# Patient Record
Sex: MALE | Race: BLACK OR AFRICAN AMERICAN
[De-identification: names, ages, dates, MRNs, and addresses within clinical notes are randomized per-mention and may not be internally consistent; named-entity substitution may affect disease eponyms.]

---

## 2017-01-09 NOTE — PHYS DOC
Past Medical History


Past Medical History:  No Pertinent History


Past Surgical History:  No Surgical History


Additional Information:  


1/2 ppd


Alcohol Use:  Rarely


Drug Use:  Marijuana





Adult General


Chief Complaint


Chief Complaint:  HEADACHE





HPI


HPI


Patient is a 21  year old male presents to the emergency department stating 

that he had smoked some marijuana tonight when he developed shortness of air 

numbness and tingling down into his left arm as well as into his lower 

extremities. Patient states that he also has a headache on the left side of his 

head. Patient denies any visual difficulty. He denies any chest pain or 

discomfort. Patient states that he does not smoke marijuana daily. Patient 

states he's never had this reaction in the past. Patient states at the current 

time he just has a headache on the left side of his head. All other signs and 

symptoms have resolved.





Review of Systems


Review of Systems





Constitutional: Denies fever or chills []


Eyes: Denies change in visual acuity, redness, or eye pain []


HENT: Denies nasal congestion or sore throat []


Respiratory: Denies cough or shortness of breath []


Cardiovascular: No additional information not addressed in HPI [


Musculoskeletal: Denies back pain or joint pain []


Integument: Denies rash or skin lesions []


Neurologic:  headache, denies focal weakness or sensory changes []





Allergies


Allergies





 Allergies








Coded Allergies Type Severity Reaction Last Updated Verified


 


  No Known Drug Allergies    3/3/16 No











Physical Exam


Physical Exam





Constitutional: Well developed, well nourished, no acute distress, non-toxic 

appearance. []


HENT: Normocephalic, atraumatic, bilateral external ears normal, oropharynx 

moist, no oral exudates, nose normal. Bilateral tympanic membranes appear to be 

normal throat appears to have erythematous with postnasal drip. Negative for 

frontal and maxillary sinus tenderness.


Eyes: PERRLA, EOMI, conjunctiva normal, no discharge. [] 


Neck: Normal range of motion, no tenderness, supple, no stridor. [] 


Cardiovascular:Heart rate regular rhythm, no murmur []


Lungs & Thorax:  Bilateral breath sounds clear to auscultation []


Skin: Warm, dry, no erythema, no rash. [] 


Back: No tenderness


Extremities: No tenderness, no cyanosis, no clubbing, ROM intact, no edema. 

Peripheral pulses 2+ cap refill brisk less than 2 seconds patient was noted to 

have equal strength bilaterally.


Neurologic: Alert and oriented X 3, normal motor function, normal sensory 

function, no focal deficits noted. []


Psychologic: Affect normal, judgement normal, mood normal. []





Current Patient Data


Vital Signs





 Vital Signs








  Date Time  Temp Pulse Resp B/P Pulse Ox O2 Delivery O2 Flow Rate FiO2


 


1/9/17 21:33 97.9 80 16  98 Room Air  





 97.9       








Lab Values





 Laboratory Tests








Test


  1/9/17


21:50


 


Urine Opiates Screen Neg (NEG)  


 


Urine Methadone Screen Neg (NEG)  


 


Urine Barbiturates Neg (NEG)  


 


Urine Phencyclidine Screen Neg (NEG)  


 


Urine


Amphetamine/Methamphetamine Neg (NEG)  


 


 


Urine Benzodiazepines Screen Neg (NEG)  


 


Urine Cocaine Screen Neg (NEG)  


 


Urine Cannabinoids Screen Pos (NEG)  


 


Urine Ethyl Alcohol Neg (NEG)  











EKG


EKG


EKG was completed at 20/1/50 was read by Dr. Liang as no STEMI. Patient does have 

some abnormality noted in the EKG. Heart rate was 81.





Radiology/Procedures


Radiology/Procedures


[]





Course & Med Decision Making


Course & Med Decision Making


Pertinent Labs and Imaging studies reviewed. (See chart for details)


Patient's urine was positive for cancer [employer]. EKG was completed patient will 

be recommended to follow-up with cardiologist in the next 4 days. Signs and 

symptoms to return back to emergency department as been provided. Patient be 

provided with Vistaril as well as ibuprofen here in the emergency department. He

'll be discharged home in stable condition with recommendations to avoid 

recreational drugs in the future. Patient agrees with discharge instructions 

treatment regimens and follow-up recommendations.


[]





Dragon Disclaimer


Dragon Disclaimer


This electronic medical record was generated, in whole or in part, using a 

voice recognition dictation system.





Departure


Departure


Impression:  


 Primary Impression:  


 Anxiety


 Additional Impression:  


 Headache


Disposition:  01 HOME, SELF-CARE


Condition:  STABLE


Referrals:  


NO PCP (PCP)








SAMRA MILLER MD


Patient Instructions:  Anxiety and Panic Attacks, Easy-to-Read, General 

Headache Without Cause





Additional Instructions:


Activity as tolerated.


Tylenol or ibuprofen for pain and discomfort.


Avoid any type or recreational drugs including marijuana, or methamphetamines.


Follow-up with radiologist in the next 4 days.


Return back to emergency percent symptoms become worse.





Problem Qualifiers








MAIK EM NP Jan 9, 2017 22:24

## 2017-01-10 NOTE — EKG
[facility]

              [address] [place] [address]

Test Date:    2017               Test Time:    21:50:13

Pat Name:     MICHAEL MONAE        Department:   

Patient ID:   [id number]           Room:          

Gender:       M                        Technician:   

:          1995               Requested By: MAIK EM

Order Number: [id number]            Reading MD:   Rodrigo Oconnor

                                 Measurements

Intervals                              Axis          

Rate:         81                       P:            43

ME:           164                      QRS:          66

QRSD:         88                       T:            39

QT:           362                                    

QTc:          421                                    

                           Interpretive Statements

SINUS RHYTHM

INCOMPLETE RIGHT BUNDLE BRANCH BLOCK



Electronically Signed On 1- 15:47:53 CST by Rodrigo Oconnor

## 2017-01-11 NOTE — RAD
EXAM: Chest, 2 views.



HISTORY: Chest pain.



COMPARISON: None.



FINDINGS: Frontal and lateral views of the chest are obtained. There is no

infiltrate, effusion or pneumothorax. The heart is normal in size. There is

hyperinflation likely due to inspiratory effort.



IMPRESSION: No acute pulmonary finding.

## 2017-01-11 NOTE — PHYS DOC
Past Medical History


Past Medical History:  Asthma


Past Surgical History:  No Surgical History


Alcohol Use:  None


Drug Use:  Marijuana





Adult General


Chief Complaint


Chief Complaint:  ABDOMINAL PAIN





HPI


HPI


Patient is a 21  year old male who presents with complaint of abdominal pain. 

Patient states that his symptoms have been present for the past 2 days. Patient 

states that he has been having problems with stress and anxiety over the past 

week. Patient started getting "squeezing" pain in the upper portion of his 

abdomen that he rates currently as 6 out of 10. Patient states that he has had 

decreased appetite. Patient denies any nausea or vomiting. Patient states he 

took ibuprofen earlier today with no relief in symptoms. The patient denies any 

history of stomach problems. Patient has had subjective fever but no chills and 

denies diarrhea. The patient states that he has had mild radiation of pain up 

towards left side of his chest.





Review of Systems


Review of Systems





Constitutional: Denies fever or chills []


Eyes: Denies change in visual acuity, redness, or eye pain []


HENT: Denies nasal congestion or sore throat []


Respiratory: Denies cough or shortness of breath []


Cardiovascular: Chest pain []


GI: Abdominal pain, anorexia, denies nausea, vomiting, bloody stools or 

diarrhea []


: Denies dysuria or hematuria []


Musculoskeletal: Denies back pain or joint pain []


Integument: Denies rash or skin lesions []


Neurologic: Denies headache, focal weakness or sensory changes []


Endocrine: Denies polyuria or polydipsia []





Current Medications


Current Medications





 Current Medications








 Medications


  (Trade)  Dose


 Ordered  Sig/Lorenzo  Start Time


 Stop Time Status Last Admin


Dose Admin


 


 Acetaminophen


  (Tylenol)  650 mg  1X  ONCE  17 11:45


 17 11:46 DC 17 12:12


650 MG


 


 Al Hydroxide/Mg


 Hydroxide


  (Mylanta Plus Xs)  30 ml  1X  ONCE  17 11:45


 17 11:46 DC 17 12:11


30 ML


 


 Famotidine


  (Pepcid)  20 mg  1X  ONCE  17 11:45


 17 11:46 DC 17 12:12


20 MG


 


 Ondansetron HCl


  (Zofran Odt)  4 mg  1X  ONCE  17 11:45


 17 11:46 DC 17 12:12


4 MG











Allergies


Allergies





 Allergies








Coded Allergies Type Severity Reaction Last Updated Verified


 


  No Known Drug Allergies    3/3/16 No











Physical Exam


Physical Exam





Constitutional: Alert, afebrile, appears mildly ill. []


HENT: Normocephalic, atraumatic, bilateral external ears normal, oropharynx 

moist, no oral exudates, nose normal. []


Eyes: PERRLA, EOMI, conjunctiva normal, no discharge. [] 


Neck: Normal range of motion, no tenderness, supple, no stridor. [] 


Cardiovascular:Heart rate regular rhythm, no murmur []


Lungs & Thorax:  Bilateral breath sounds clear to auscultation []


Abdomen: Bowel sounds normal, soft, no tenderness, no masses, no pulsatile 

masses. [] 


Skin: Warm, dry, no erythema, no rash. [] 


Back: No tenderness, no CVA tenderness. [] 


Extremities: No tenderness, no cyanosis, no clubbing, ROM intact, no edema. [] 


Neurologic: Alert and oriented X 3, normal motor function, normal sensory 

function, no focal deficits noted. []





Current Patient Data


Vital Signs





 Vital Signs








  Date Time  Temp Pulse Resp B/P Pulse Ox O2 Delivery O2 Flow Rate FiO2


 


17 10:20 97.8 60 18 116/68 98 Room Air  





 97.8       








Lab Values





 Laboratory Tests








Test


  17


11:56


 


Group A Streptococcus Rapid


  Negative


(NEGATIVE)











EKG


EKG


Interpreted by me: Heart rate 54, sinus rhythm, normal intervals, incomplete 

right bundle branch block, J-point elevation, no acute ST elevation or 

depression []





Radiology/Procedures


Radiology/Procedures





 [facility]


 [address] [place], [place] [id number] [phone]


 


 IMAGING REPORT





 Signed





PATIENT: MICHAEL MONAE ACCOUNT: [id number] MRN#: [id number]


: 1995 LOCATION: ER AGE: 21


SEX: M EXAM DT: 17 ACCESSION#: [id number]


STATUS: REG ER ORD. PHYSICIAN: SENIA COOPER MD 


REASON: L sided chest pain


PROCEDURE: CHEST PA & LATERAL














EXAM: Chest, 2 views.





HISTORY: Chest pain.





COMPARISON: None.





FINDINGS: Frontal and lateral views of the chest are obtained. There is no


infiltrate, effusion or pneumothorax. The heart is normal in size. There is


hyperinflation likely due to inspiratory effort.





IMPRESSION: No acute pulmonary finding.

















DICTATED and SIGNED BY:     KWASI PAULINO MD


DATE:     17 1157





CC: SENIA COOPER MD; NO PCP ~


[]





Course & Med Decision Making


Course & Med Decision Making


Pertinent Labs and Imaging studies reviewed. (See chart for details)





Patient was given Maalox, Pepcid, Zofran, Tylenol to help with symptoms. The 

patient's symptoms appear consistent with gastritis. Advised the patient follow-

up in 2-3 days with his primary doctor for reevaluation and return to emergency 

department for any worsening symptoms. Patient voiced understanding and in 

agreement with treatment plan.





Dragon Disclaimer


Dragon Disclaimer


This electronic medical record was generated, in whole or in part, using a 

voice recognition dictation system.





Departure


Departure


Impression:  


 Primary Impression:  


 Gastritis


 Additional Impression:  


 Anxiety


Disposition:   HOME, SELF-CARE


Condition:  IMPROVED


Referrals:  


NO PCP (PCP)


Patient Instructions:  Gastritis, Adult





Additional Instructions:


Follow-up with your primary doctor in 2-3 days. Return to the emergency 

department for any worsening symptoms.


Scripts


Famotidine (Pepcid)20 Mg [id number] Mg PO BID #30 TAB


   Prov:SENIA COOPER MD         17





Problem Qualifiers








 Primary Impression:  


 Gastritis


 Gastritis type:  unspecified gastritis  Chronicity:  acute  Gastritis bleeding

:  without bleeding  Qualified Code:  K29.00 - Acute gastritis without bleeding





SENIA COOPER MD 2017 12:39

## 2017-01-11 NOTE — EKG
[facility]

              [address] [place] [address]

Test Date:    2017               Test Time:    11:39:03

Pat Name:     MICHAEL MONAE        Department:   

Patient ID:   [id number]           Room:          

Gender:       M                        Technician:   

:          1995               Requested By: SENIA COOPER

Order Number: [id number]            Reading MD:   Rodrigo Oconnor

                                 Measurements

Intervals                              Axis          

Rate:         54                       P:            41

NM:           156                      QRS:          46

QRSD:         84                       T:            31

QT:           390                                    

QTc:          371                                    

                           Interpretive Statements

SINUS RHYTHM



Electronically Signed On 2017 11:26:58 CST by Rodrigo Oconnor

## 2017-01-28 NOTE — PHYS DOC
Past Medical History


Past Medical History:  Anxiety, Asthma


Past Surgical History:  No Surgical History


Alcohol Use:  None


Drug Use:  Marijuana





Adult General


Chief Complaint


Chief Complaint:  MULTIPLE COMPLAINTS





HPI


HPI


Patient is a 21  year old male who presents with feeling anxious since trying 

to go to bed.  His symptoms start with palpitations, headache, and then gets 

body tingling. He was trying to breath through his symptoms, but his girlfriend 

told him to come to the ED. He is feeling mostly better since being here. He 

states this has happened a few times recently and he is concerned about it 

becoming more frequent. He denies vision changes, dizziness, chest pain, dyspnea

, numbness, weakness, abd pain, f/c, n/v.





Review of Systems


Review of Systems





Constitutional: Denies fever or chills []


Eyes: Denies change in visual acuity, redness, or eye pain []


HENT: Denies nasal congestion or sore throat []


Respiratory: Denies cough or shortness of breath []


Cardiovascular: No additional information not addressed in HPI []


GI: Denies abdominal pain, nausea, vomiting, bloody stools or diarrhea []


: Denies dysuria or hematuria []


Musculoskeletal: Denies back pain or joint pain []


Integument: Denies rash or skin lesions []


Neurologic: Denies focal weakness or sensory changes []


Endocrine: Denies polyuria or polydipsia []





Current Medications


Current Medications





 Current Medications








 Medications


  (Trade)  Dose


 Ordered  Sig/[facility]  Start Time


 Stop Time Status Last Admin


Dose Admin


 


 Hydroxyzine HCl


  (Atarax)  10 mg  1X  ONCE  1/28/17 00:45


 1/28/17 00:46 DC 1/28/17 00:46


10 MG











Allergies


Allergies





 Allergies








Coded Allergies Type Severity Reaction Last Updated Verified


 


  No Known Drug Allergies    3/3/16 No











Physical Exam


Physical Exam





Constitutional: Well developed, well nourished, no acute distress, non-toxic 

appearance. []


HENT: Normocephalic, atraumatic, bilateral external ears normal, oropharynx 

moist, no oral exudates, nose normal. []


Eyes: PERRLA, EOMI. [] 


Neck: Normal range of motion, supple. [] 


Cardiovascular:Heart rate regular rhythm []


Lungs & Thorax:  Bilateral breath sounds clear to auscultation []


Abdomen: Bowel sounds normal, soft, no tenderness. [] 


Skin: Warm, dry, no erythema, no rash. [] 


Back: Normal ROM. [] 


Extremities: NROM intact, no edema. [] 


Neurologic: Alert and oriented X 3, normal motor function, normal sensory 

function, no focal deficits noted, cranial nerves II through XII intact. []


Psychologic: Affect normal, judgement normal, mood normal. []





Current Patient Data


Vital Signs





 Vital Signs








  Date Time  Temp Pulse Resp B/P Pulse Ox O2 Delivery O2 Flow Rate FiO2


 


1/28/17 00:48  64 20 119/68 99 Room Air  


 


1/27/17 23:50 98.3       





 98.3       











EKG


EKG


EKG as interpreted by me as normal sinus rhythm, rate 67, no ST-T changes, 

normal intervals, no ectopy





Course & Med Decision Making


Course & Med Decision Making


Discussed to continue breathing exercises and he can take vistaril prn. 

Encouraged him to f/u with PCP or psychiatry. Return precautions given. He 

understands and agrees with plan.





Dragon Disclaimer


Dragon Disclaimer


This electronic medical record was generated, in whole or in part, using a 

voice recognition dictation system.





Departure


Departure


Impression:  


 Primary Impression:  


 Anxiety


Disposition:  01 HOME, SELF-CARE


Condition:  STABLE


Referrals:  


NO PCP (PCP)


Patient Instructions:  Anxiety and Panic Attacks, Easy-to-Read





Additional Instructions:


Take Vistaril as needed for anxiety. Follow-up with your primary care doctor. 

Return for any concerns.


Scripts


Hydroxyzine Pamoate (Vistaril)25 Mg Capsule1 Cap PO TID PRN ANXIETY #30 CAP  

Ref 0


   Prov:ELISE MALIK MD         1/28/17








ELISE MALIK MD Jan 28, 2017 00:50

## 2017-01-28 NOTE — EKG
[facility]

              [address] [place] [address]

Test Date:    2017               Test Time:    00:36:01

Pat Name:     MICHAEL MONAE        Department:   

Patient ID:   [id number]           Room:          

Gender:       M                        Technician:   

:          1995               Requested By: ELISE MALIK

Order Number: [id number]            Reading MD:   Rodrigo Oconnor

                                 Measurements

Intervals                              Axis          

Rate:         67                       P:            39

TX:           162                      QRS:          46

QRSD:         96                       T:            28

QT:           376                                    

QTc:          400                                    

                           Interpretive Statements

SINUS RHYTHM

NON-SPECIFIC ST/T CHANGES

Electronically Signed On 2017 10:31:21 CST by Rodriog Oconnor

## 2017-03-07 NOTE — PHYS DOC
Past Medical History


Past Medical History:  Anxiety, Asthma


Past Surgical History:  No Surgical History


Alcohol Use:  Occasionally


Drug Use:  Marijuana





Adult General


Chief Complaint


Chief Complaint:  FLU SYMPTOM





HPI


HPI


Patient is a 22  year old male who presents with complaint of shortness of 

breath and wheezing. Patient states that he has history of asthma. Patient was 

diagnosed with influenza one month ago. Patient states that his cough and 

shortness of breath have not completely gone away since resolution of other 

symptoms. The patient states that he gets tightness and wheezing at nighttime 

when sleeping. Patient states that this improves during the daytime. Patient 

has not had any fevers. Patient does admit to congestion and mild sore throat. 

The patient does not have an albuterol inhaler at home and has not treated his 

symptoms with any other medications.





Review of Systems


Review of Systems





Constitutional: Denies fever or chills []


Eyes: Denies change in visual acuity, redness, or eye pain []


HENT: Nasal congestion, sore throat []


Respiratory: Shortness of breath, wheezing []


Cardiovascular: Denies chest pain or edema []


GI: Denies abdominal pain, nausea, vomiting, bloody stools or diarrhea []


: Denies dysuria or hematuria []


Musculoskeletal: Denies back pain or joint pain []


Integument: Denies rash or skin lesions []


Neurologic: Denies headache, focal weakness or sensory changes []





Allergies


Allergies





 Allergies








Coded Allergies Type Severity Reaction Last Updated Verified


 


  No Known Drug Allergies    3/3/16 No











Physical Exam


Physical Exam





Constitutional: Alert, afebrile, no acute distress. []


HENT: Normocephalic, atraumatic, bilateral external ears normal, oropharynx 

moist, bilateral tonsillar hypertrophy with mild erythema, no exudates, nose 

normal. []


Eyes: PERRLA, EOMI, conjunctiva normal, no discharge. [] 


Neck: Normal range of motion, no tenderness, supple, no stridor. [] 


Cardiovascular:Heart rate regular rhythm, no murmur []


Lungs & Thorax:  Bilateral breath sounds clear to auscultation, non-restricted 

air movement bilaterally []


Abdomen: Bowel sounds normal, soft, no tenderness, no masses, no pulsatile 

masses. [] 


Skin: Warm, dry, no erythema, no rash. [] 


Back: No tenderness, no CVA tenderness. [] 


Extremities: No tenderness, no cyanosis, no clubbing, ROM intact, no edema. [] 


Neurologic: Alert and oriented X 3, normal motor function, normal sensory 

function, no focal deficits noted. []





Current Patient Data


Vital Signs





 Vital Signs








  Date Time  Temp Pulse Resp B/P Pulse Ox O2 Delivery O2 Flow Rate FiO2


 


3/7/17 13:23 98.3 61 18 119/83 100 Room Air  





 98.3       











EKG


EKG


Not performed []





Radiology/Procedures


Radiology/Procedures


Not performed []





Course & Med Decision Making


Course & Med Decision Making


Pertinent Labs and Imaging studies reviewed. (See chart for details)





Patient's lung sounds are clear to auscultation. The patient's symptoms may be 

triggered by allergens as this was explained to the patient. The patient had a 

negative strep test in the emergency department. Patient written for albuterol 

inhaler to use as needed for wheezing. Recommended use of daily antihistamine 

to help limit symptoms. Advise follow-up in 3-5 days a primary doctor and 

return to emergency department for any worsening symptoms. Patient voiced 

understanding and in agreement with treatment plan.





Dragon Disclaimer


Dragon Disclaimer


This electronic medical record was generated, in whole or in part, using a 

voice recognition dictation system.





Departure


Departure


Impression:  


 Primary Impression:  


 Asthma


Disposition:  01 HOME, SELF-CARE


Condition:  IMPROVED


Referrals:  


NO PCP (PCP)


Patient Instructions:  Asthma, Adult





Additional Instructions:


Follow-up with your primary doctor in 3-5 days. Return to emergency department 

for any worsening symptoms.


Scripts


Albuterol Sulfate (Proair Hfa Inhaler)8.5 Gm Hfa.aer.ad2 Puff INH Q4-6HRS PRN 

SHORTNESS OF BREATH #1 INHALER  Ref 0


   Prov:SENIA COOPER MD         3/7/17





Problem Qualifiers








 Primary Impression:  


 Asthma


 Asthma severity:  mild intermittent  Asthma complication type:  uncomplicated  

Qualified Code:  J45.20 - Mild intermittent asthma, uncomplicated





SENIA COOPER MD Mar 7, 2017 14:36

## 2017-07-07 ENCOUNTER — HOSPITAL ENCOUNTER (EMERGENCY)
Dept: HOSPITAL 61 - ER | Age: 22
Discharge: HOME | End: 2017-07-07
Payer: SELF-PAY

## 2017-07-07 VITALS — DIASTOLIC BLOOD PRESSURE: 60 MMHG | SYSTOLIC BLOOD PRESSURE: 100 MMHG

## 2017-07-07 DIAGNOSIS — F41.9: ICD-10-CM

## 2017-07-07 DIAGNOSIS — J45.909: ICD-10-CM

## 2017-07-07 DIAGNOSIS — F12.10: ICD-10-CM

## 2017-07-07 DIAGNOSIS — Z91.013: ICD-10-CM

## 2017-07-07 DIAGNOSIS — R07.89: Primary | ICD-10-CM

## 2017-07-07 PROCEDURE — 71101 X-RAY EXAM UNILAT RIBS/CHEST: CPT

## 2017-07-07 NOTE — ED.ADGEN
Past Medical History


Past Medical History:  No Pertinent History, Anxiety, Asthma


Past Surgical History:  No Surgical History


Alcohol Use:  Occasionally


Drug Use:  Marijuana





Adult General


Chief Complaint


Chief Complaint:  RIB PAIN





HPI


HPI


Patient is a 22  year old with chronic intermittent chest wall pain who 

presents with right lateral chest wall pain at work this evening. Pain is worse 

with palpation, deep breathing and movement. Patient denies cough, sore throat 

and fever. Patient has been evaluated in the ED multiple times and has 

previously been in admitted for evaluation of his chest complaint. He has had 

echocardiogram which was normal. He has not follow-up with PCP. He does not 

take any routine pain medications. Patient does have history of asthma, but 

denies shortness of breath, or wheezing. No other symptoms or complaints.





Review of Systems


Review of Systems


ROS as per HPI.





Allergies


Allergies





Allergies








Coded Allergies Type Severity Reaction Last Updated Verified


 


  shellfish derived Allergy Intermediate  4/20/17 No











Physical Exam


Physical Exam





Constitutional: Well developed, well nourished, no acute distress, non-toxic 

appearance.


HENT: Normocephalic, atraumatic, bilateral external ears normal, oropharynx 

moist, no oral exudates, nose normal.


Eyes: PERRLA, EOMI, conjunctiva normal.


Neck: Normal range of motion, no tenderness, supple, no stridor.


Cardiovascular:Heart rate regular rhythm, no murmur. 


Lungs & Thorax:  Bilateral breath sounds clear to auscultation. Right rib, 

chest wall pain, tenderness. 


Abdomen: Bowel sounds normal, soft, no tenderness.


Skin: Warm, dry.


Back: No tenderness.


Extremities: No tenderness.


Neurologic: Alert and oriented X 3, normal motor function, normal sensory 

function, no focal deficits noted.


Psychologic: Affect normal, judgement normal, mood normal.





Current Patient Data


Vital Signs





 Vital Signs








  Date Time  Temp Pulse Resp B/P (MAP) Pulse Ox O2 Delivery O2 Flow Rate FiO2


 


7/7/17 04:00  50 16 100/60 (73) 99 Room Air  


 


7/7/17 02:00 98.3       





 98.3       











EKG


EKG


[]





Radiology/Procedures


Radiology/Procedures


[Right rib series: No acute abnormalities]





Course & Med Decision Making


Course & Med Decision Making


Pertinent Labs and Imaging studies reviewed. (See chart for details)





Reproducible pleuritic chest wall pain. Recommend supportive treatment with PCP 

follow-up.]





Dragon Disclaimer


Dragon Disclaimer


This electronic medical record was generated, in whole or in part, using a 

voice recognition dictation system.











MOHINDER CARPENTER DO Jul 7, 2017 08:03

## 2017-07-07 NOTE — RAD
Right RIBS with chest, 7/7/2017:



History: Rib pain



No fracture or rib abnormality is detected. There is no evidence of underlying

pneumothorax, hemothorax or pulmonary infiltrate. The heart size is normal.



IMPRESSION: No significant right rib abnormality is detected.

## 2017-09-13 ENCOUNTER — HOSPITAL ENCOUNTER (EMERGENCY)
Dept: HOSPITAL 61 - ER | Age: 22
LOS: 1 days | Discharge: HOME | End: 2017-09-14
Payer: SELF-PAY

## 2017-09-13 VITALS — WEIGHT: 155 LBS | BODY MASS INDEX: 21.7 KG/M2 | HEIGHT: 71 IN

## 2017-09-13 DIAGNOSIS — T78.1XXA: ICD-10-CM
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
## 2017-09-13 DIAGNOSIS — F41.9: ICD-10-CM

## 2017-09-13 DIAGNOSIS — J39.2: Primary | ICD-10-CM

## 2017-09-13 DIAGNOSIS — J45.909: ICD-10-CM

## 2017-09-13 DIAGNOSIS — Z91.013: ICD-10-CM

## 2017-09-13 PROCEDURE — 96375 TX/PRO/DX INJ NEW DRUG ADDON: CPT

## 2017-09-13 PROCEDURE — S0028 INJECTION, FAMOTIDINE, 20 MG: HCPCS

## 2017-09-13 PROCEDURE — 96374 THER/PROPH/DIAG INJ IV PUSH: CPT

## 2017-09-13 PROCEDURE — 99284 EMERGENCY DEPT VISIT MOD MDM: CPT

## 2017-09-13 PROCEDURE — 96361 HYDRATE IV INFUSION ADD-ON: CPT

## 2017-09-13 NOTE — ED.ADGEN
Past Medical History


Past Medical History:  No Pertinent History, Anxiety, Asthma


Past Surgical History:  No Surgical History


Alcohol Use:  Occasionally


Drug Use:  Marijuana





Adult General


Chief Complaint


Chief Complaint:  ALLERGIC REACTION





HPI


HPI





Patient is a 22  year old and, who presents to the emergency department with a 

complaint of an allergic reaction. Patient is a history of shellfish allergy 

ominous states that he ate a portion of prepared [place], which came in a mirror 

needed sauce. This occurred about an hour before his symptoms began, primarily 

throat itching and a feeling of throat tightness. Patient denies any hives, any 

nausea, any vomiting, any diarrhea, abdominal pain, any wheezing, any chest pain

, any difficulty with breathing or swallowing. He states that when he was 9 

years old he had a severe allergic reaction to shellfish, he has not had 

reactions that time. He does not have an EpiPen. He does not have any 

medications he takes on a regular basis. Denies any other ingestions or 

exposures, any other preceding symptoms.





Review of Systems


Review of Systems


Constitutional:  Denies fever or chills. []


Eyes:  Denies change in visual acuity. []


HENT:  Denies nasal congestion or sore throat. []"Throat itching".


Respiratory:  Denies cough or shortness of breath. [] 


Cardiovascular:  Denies chest pain or edema. [] 


GI:  Denies abdominal pain, nausea, vomiting, bloody stools or diarrhea. [] 


:  Denies dysuria. [] 


Musculoskeletal:  Denies back pain or joint pain. [] 


Integument:  Denies rash. [] 


Neurologic:  Denies headache, focal weakness or sensory changes. [] 


Endocrine:  Denies polyuria or polydipsia. [] 


Lymphatic:  Denies swollen glands. [] 


Psychiatric:  Denies depression or anxiety. []





Current Medications


Current Medications





Current Medications








 Medications


  (Trade)  Dose


 Ordered  Sig/Lorenzo  Start Time


 Stop Time Status Last Admin


Dose Admin


 


 Dexamethasone


 Sodium Phosphate


  (Decadron)  10 mg  1X  ONCE  9/13/17 22:15


 9/13/17 22:16 DC 9/13/17 22:26


10 MG


 


 Diphenhydramine


 HCl


  (Benadryl)  50 mg  1X  ONCE  9/13/17 22:15


 9/13/17 22:16 DC 9/13/17 22:25


50 MG


 


 Famotidine


  (Pepcid)  20 mg  1X  ONCE  9/13/17 22:15


 9/13/17 22:16 DC 9/13/17 22:27


20 MG


 


 Sodium Chloride  1,000 ml @ 


 1,000 mls/hr  Q1H  9/13/17 22:15


 9/13/17 23:14 DC 9/13/17 22:34


1,000 MLS/HR











Allergies


Allergies





Allergies








Coded Allergies Type Severity Reaction Last Updated Verified


 


  shellfish derived Allergy Intermediate  4/20/17 No











Physical Exam


Physical Exam





Constitutional: Well developed, well nourished, no acute distress, non-toxic 

appearance. []


HENT: Normocephalic, atraumatic, bilateral external ears normal, oropharynx 

moist, no oral exudates, nose normal. []


Eyes: PERRLA, EOMI, conjunctiva normal, no discharge. [] 


Neck: Normal range of motion, no tenderness, supple, no stridor. [] 


Cardiovascular:Heart rate regular rhythm, no murmur, S1, S2, no rubs or 

gallops. []


Lungs & Thorax:  Bilateral breath sounds clear to auscultation, no wheezing, no 

rhonchi, rales. []


Abdomen: Bowel sounds normal, soft, no tenderness, no masses, no pulsatile 

masses. [] 


Skin: Warm, dry, no erythema, no rash. [] 


Back: No tenderness, no CVA tenderness. [] 


Extremities: No tenderness, no cyanosis, no clubbing, ROM intact, no edema. 

Negative Homans sign.[] 


Neurologic: Alert and oriented X 3, normal motor function, normal sensory 

function, no focal deficits noted. []


Psychologic: Affect normal, judgement normal, mood normal. []





Current Patient Data


Vital Signs





 Vital Signs








  Date Time  Temp Pulse Resp B/P (MAP) Pulse Ox O2 Delivery O2 Flow Rate FiO2


 


9/14/17 00:00  81 16 147/84 (105) 99 Room Air  


 


9/13/17 21:52 98.6       





 98.6       











EKG


EKG


ECG: Rhythm strip: Heart rate 68 beats/minute, sinus rhythm, no ectopy. As 

interpreted by me.





Radiology/Procedures


Radiology/Procedures


Not indicated.[]





Course & Med Decision Making


Course & Med Decision Making


Pertinent Labs and Imaging studies reviewed. (See chart for details)





Patient well-appearing, no signs of airway compromise, systemic symptoms or 

cutaneous symptoms. Patient's significant other is now bedside, states that he 

did take some "allergy medication", which may have been generic type of Benadryl

, prior to coming to the ED. Unclear the exact dosage or contents. Patient 

received Decadron, IV fluids, famotidine, and Benadryl in the emergency 

department. Is agreeable to these medications and observation in the ED.





Patient observed in the emergency department for 3 hours, with administration 

of Benadryl, famotidine, and Decadron. IV fluids also given. On reevaluation, 

patient states that he is feeling much better, and THROAT symptoms have 

resolved. Patient did not develop any other concerning symptoms during his ED 

evaluation. I did discuss at length the patient importance of avoiding any 

seafood, or other products that may expose him to cross contamination. We also 

discussed use of it EpiPen autoinjector, patient was given a prescription for 

an epinephrine autoinjector to be carried with him at all times, also discuss 

utility of allergy testing in establishing a primary care provider. We 

discussed concerning symptoms that prompt return to the ED. Patient voiced 

understanding and agreement with plan and instructions as stated. Patient 

discharged home in stable condition with significant other with plan and 

precautions as above.





Dragon Disclaimer


Dragon Disclaimer


This electronic medical record was generated, in whole or in part, using a 

voice recognition dictation system.





Departure


Impression:  


 Primary Impression:  


 Allergic reaction


Disposition:  01 HOME, SELF-CARE


Condition:  IMPROVED


Scripts


Epinephrine (EPIPEN 2-SILVIA) 0.3 Mg/0.3 Ml Auto.injct


0.3 MG IJ PRN Y for ANAPHYLAXIS, #1 SYR 1 Refill


   Prov: MERON CALDERON DO         9/14/17











MERON CALDERON DO Sep 13, 2017 22:32

## 2017-09-14 VITALS — SYSTOLIC BLOOD PRESSURE: 112 MMHG | DIASTOLIC BLOOD PRESSURE: 65 MMHG

## 2017-09-29 ENCOUNTER — HOSPITAL ENCOUNTER (EMERGENCY)
Dept: HOSPITAL 61 - ER | Age: 22
Discharge: HOME | End: 2017-09-29
Payer: SELF-PAY

## 2017-09-29 VITALS — HEIGHT: 71 IN | WEIGHT: 155 LBS | BODY MASS INDEX: 21.7 KG/M2

## 2017-09-29 VITALS — SYSTOLIC BLOOD PRESSURE: 141 MMHG | DIASTOLIC BLOOD PRESSURE: 75 MMHG

## 2017-09-29 DIAGNOSIS — Z91.013: ICD-10-CM

## 2017-09-29 DIAGNOSIS — F12.10: ICD-10-CM

## 2017-09-29 DIAGNOSIS — R07.89: Primary | ICD-10-CM

## 2017-09-29 DIAGNOSIS — J45.909: ICD-10-CM

## 2017-09-29 DIAGNOSIS — R00.2: ICD-10-CM

## 2017-09-29 DIAGNOSIS — R42: ICD-10-CM

## 2017-09-29 LAB
ALBUMIN SERPL-MCNC: 4.1 G/DL (ref 3.4–5)
ALBUMIN/GLOB SERPL: 1.4 {RATIO} (ref 1–1.7)
ALP SERPL-CCNC: 78 U/L (ref 46–116)
ALT SERPL-CCNC: 19 U/L (ref 16–63)
ANION GAP SERPL CALC-SCNC: 9 MMOL/L (ref 6–14)
AST SERPL-CCNC: 12 U/L (ref 15–37)
BASOPHILS # BLD AUTO: 0 X10^3/UL (ref 0–0.2)
BASOPHILS NFR BLD: 0 % (ref 0–3)
BILIRUB SERPL-MCNC: 0.3 MG/DL (ref 0.2–1)
BUN SERPL-MCNC: 9 MG/DL (ref 8–26)
BUN/CREAT SERPL: 8 (ref 6–20)
CALCIUM SERPL-MCNC: 8.9 MG/DL (ref 8.5–10.1)
CHLORIDE SERPL-SCNC: 102 MMOL/L (ref 98–107)
CO2 SERPL-SCNC: 29 MMOL/L (ref 21–32)
CREAT SERPL-MCNC: 1.1 MG/DL (ref 0.7–1.3)
EOSINOPHIL NFR BLD: 2 % (ref 0–3)
ERYTHROCYTE [DISTWIDTH] IN BLOOD BY AUTOMATED COUNT: 13.6 % (ref 11.5–14.5)
GFR SERPLBLD BASED ON 1.73 SQ M-ARVRAT: 99.5 ML/MIN
GLOBULIN SER-MCNC: 2.9 G/DL (ref 2.2–3.8)
GLUCOSE SERPL-MCNC: 101 MG/DL (ref 70–99)
HCT VFR BLD CALC: 45.7 % (ref 39–53)
HGB BLD-MCNC: 15.2 G/DL (ref 13–17.5)
LYMPHOCYTES # BLD: 1.8 X10^3/UL (ref 1–4.8)
LYMPHOCYTES NFR BLD AUTO: 20 % (ref 24–48)
MCH RBC QN AUTO: 30 PG (ref 25–35)
MCHC RBC AUTO-ENTMCNC: 33 G/DL (ref 31–37)
MCV RBC AUTO: 89 FL (ref 79–100)
MONOCYTES NFR BLD: 5 % (ref 0–9)
NEUTROPHILS NFR BLD AUTO: 72 % (ref 31–73)
PLATELET # BLD AUTO: 239 X10^3/UL (ref 140–400)
POTASSIUM SERPL-SCNC: 3.6 MMOL/L (ref 3.5–5.1)
PROT SERPL-MCNC: 7 G/DL (ref 6.4–8.2)
RBC # BLD AUTO: 5.11 X10^6/UL (ref 4.3–5.7)
SODIUM SERPL-SCNC: 140 MMOL/L (ref 136–145)
WBC # BLD AUTO: 8.6 X10^3/UL (ref 4–11)

## 2017-09-29 PROCEDURE — 80053 COMPREHEN METABOLIC PANEL: CPT

## 2017-09-29 PROCEDURE — 71020: CPT

## 2017-09-29 PROCEDURE — 93005 ELECTROCARDIOGRAM TRACING: CPT

## 2017-09-29 PROCEDURE — 85025 COMPLETE CBC W/AUTO DIFF WBC: CPT

## 2017-09-29 PROCEDURE — 36415 COLL VENOUS BLD VENIPUNCTURE: CPT

## 2017-09-29 PROCEDURE — 84484 ASSAY OF TROPONIN QUANT: CPT

## 2017-09-29 PROCEDURE — 83690 ASSAY OF LIPASE: CPT

## 2017-09-29 NOTE — PHYS DOC
Past Medical History


Past Medical History:  Anxiety, Asthma


Past Surgical History:  No Surgical History


Alcohol Use:  Occasionally


Drug Use:  Marijuana





Adult General


Chief Complaint


Chief Complaint:  CHEST PAIN





HPI


HPI





Patient is a 22  year old male who presents with palpitations.  The patient 

states a few hours prior to arrival he drank "a lot" of alcohol & smoked 

marijuana.  Shortly thereafter he experienced sensation of racing heartbeat, 

chest tightness, & lightheadedness.  Denies fevers/chills, cough, shortness of 

breath, nausea, diaphoresis, lower extremity pain/swelling.  He is asymptomatic 

at time of my evaluation.  Reports previous history of chest pain & was 

actually admitted here for workup, no significant cause identified.  He has 

history of asthma & smokes cigarettes.  He does not have a PCP.  Denies family 

history of premature CAD, DVT/PE.





Review of Systems


Review of Systems


Constitutional: Denies fever or chills 


Eyes: Denies change in visual acuity


HENT: Denies nasal congestion or sore throat 


Respiratory: Denies cough or shortness of breath 


Cardiovascular:  Reports chest pain & palpitations


GI: Denies abdominal pain, nausea, vomiting, bloody stools or diarrhea


: Denies dysuria or hematuria 


Musculoskeletal: Denies back pain or joint pain 


Integument: Denies rash or skin lesions 


Neurologic: Denies headache, focal weakness or sensory changes





Allergies


Allergies





Allergies








Coded Allergies Type Severity Reaction Last Updated Verified


 


  shellfish derived Allergy Intermediate  4/20/17 No











Physical Exam


Physical Exam


Constitutional: Well developed, well nourished, no acute distress, non-toxic 

appearance. 


HENT: Normocephalic, atraumatic, bilateral external ears normal, oropharynx 

moist, nose normal.


Eyes: conjunctiva injected bilaterally, no discharge.


Neck: supple, no stridor.


Cardiovascular:  RRR, no murmurs, no edema. 


Lungs & Thorax:  LCTAB, no wheezing, no respiratory distress.  no reproducible 

tenderness with palpation over anterior chest wall.


Abdomen: soft, nontender, nondistended.


Skin: Warm, dry, no erythema, no rash.


Back: No tenderness.


Extremities: No tenderness, no edema.  no calf pain or swelling.  


Neurologic: Alert and oriented X 3, no focal deficits noted. 


Psychologic: Affect normal, judgement normal, mood normal.





Current Patient Data


Vital Signs





 Vital Signs








  Date Time  Temp Pulse Resp B/P (MAP) Pulse Ox O2 Delivery O2 Flow Rate FiO2


 


9/29/17 00:50 98.2 92 18 141/75 (97) 100 Room Air  





 98.2       








Lab Values





 Laboratory Tests








Test


  9/29/17


01:45


 


White Blood Count


  8.6 x10^3/uL


(4.0-11.0)


 


Red Blood Count


  5.11 x10^6/uL


(4.30-5.70)


 


Hemoglobin


  15.2 g/dL


(13.0-17.5)


 


Hematocrit


  45.7 %


(39.0-53.0)


 


Mean Corpuscular Volume


  89 fL ()


 


 


Mean Corpuscular Hemoglobin 30 pg (25-35)  


 


Mean Corpuscular Hemoglobin


Concent 33 g/dL


(31-37)


 


Red Cell Distribution Width


  13.6 %


(11.5-14.5)


 


Platelet Count


  239 x10^3/uL


(140-400)


 


Neutrophils (%) (Auto) 72 % (31-73)  


 


Lymphocytes (%) (Auto) 20 % (24-48)  L


 


Monocytes (%) (Auto) 5 % (0-9)  


 


Eosinophils (%) (Auto) 2 % (0-3)  


 


Basophils (%) (Auto) 0 % (0-3)  


 


Neutrophils # (Auto)


  6.2 x10^3uL


(1.8-7.7)


 


Lymphocytes # (Auto)


  1.8 x10^3/uL


(1.0-4.8)


 


Monocytes # (Auto)


  0.4 x10^3/uL


(0.0-1.1)


 


Eosinophils # (Auto)


  0.2 x10^3/uL


(0.0-0.7)


 


Basophils # (Auto)


  0.0 x10^3/uL


(0.0-0.2)


 


Sodium Level


  140 mmol/L


(136-145)


 


Potassium Level


  3.6 mmol/L


(3.5-5.1)


 


Chloride Level


  102 mmol/L


()


 


Carbon Dioxide Level


  29 mmol/L


(21-32)


 


Anion Gap 9 (6-14)  


 


Blood Urea Nitrogen


  9 mg/dL (8-26)


 


 


Creatinine


  1.1 mg/dL


(0.7-1.3)


 


Estimated GFR


(Cockcroft-Gault) 99.5  


 


 


BUN/Creatinine Ratio 8 (6-20)  


 


Glucose Level


  101 mg/dL


(70-99)  H


 


Calcium Level


  8.9 mg/dL


(8.5-10.1)


 


Total Bilirubin


  0.3 mg/dL


(0.2-1.0)


 


Aspartate Amino Transferase


(AST) 12 U/L (15-37)


L


 


Alanine Aminotransferase (ALT)


  19 U/L (16-63)


 


 


Alkaline Phosphatase


  78 U/L


()


 


Troponin I Quantitative


  < 0.017 ng/mL


(0.000-0.055)


 


Total Protein


  7.0 g/dL


(6.4-8.2)


 


Albumin


  4.1 g/dL


(3.4-5.0)


 


Albumin/Globulin Ratio 1.4 (1.0-1.7)  


 


Lipase


  122 U/L


()





 Laboratory Tests


9/29/17 01:45








 Laboratory Tests


9/29/17 01:45














EKG


EKG


interpreted by me:  NSR rate 66, J point elevation, normal intervals, no 

ectopy.  no significant change from 1/28/2017





Radiology/Procedures


Radiology/Procedures


CXR:  interpreted by me:  no cardiomegaly, no infiltrate, no pneumothorax, no 

acute process.  patient's very thick dreadlock style hair appears to be 

projecting over the shoulders & upper chest.[]





Course & Med Decision Making


Course & Med Decision Making


Pertinent Labs and Imaging studies reviewed. (See chart for details)


The patient presents with palpitations.  Heart rate in normal range with no 

evidence of arrhythmia on telemetry monitoring.  EKG shows early repolarization 

which is similar to previous studies.  Obtained labs & CXR.  No significant 

abnormality & he remained asymptomatic.  Patient requests discharge home.  

Recommend rest, hydration, tylenol/ibuprofen for pain, avoid abuse of drugs & 

alcohol, follow up with primary care in 2-3 days.  Come back for severe chest 

pain or shortness of breath, or any otherwise worsening condition.  DIscharged 

home in stable condition.


[]





Dragon Disclaimer


Dragon Disclaimer


This electronic medical record was generated, in whole or in part, using a 

voice recognition dictation system.





Departure


Departure


Impression:  


 Primary Impression:  


 Chest pain


Disposition:  01 HOME, SELF-CARE


Condition:  STABLE


Referrals:  


NO PCP (PCP)








DAVID HUTCHINSON MD


Patient Instructions:  Chest Pain (Nonspecific), Easy-to-Read, Drug Abuse, FAQs





Additional Instructions:  


You were seen in the emergency department today for chest pain.  Your tests did 

not show a serious cause of symptoms.  This could be related to drug & alcohol 

use.  Avoid abusing drugs.  Follow up with Dr. Hutchinson or the primary care doctor 

of your choice in 2-3 days.  Come back for severe chest pain or shortness of 

breath, fast or irregular heartbeat, any otherwise worsening condition.











BAILEE HINDS MD Sep 29, 2017 02:53

## 2017-09-29 NOTE — RAD
EXAM: Chest 2 views.



HISTORY: Chest pain.



COMPARISON: 7/7/2017.



FINDINGS: Frontal and lateral views of the chest are obtained.    



There are no confluent infiltrates. There is no pneumothorax or pleural

effusion. The heart is not enlarged. Densities superiorly on the patient's

hair.



IMPRESSION:

1. No confluent infiltrates.

## 2017-09-29 NOTE — EKG
[facility]

              [address] [place] [address]

Test Date:    2017               Test Time:    01:56:40

Pat Name:     MICHAEL MONAE        Department:   

Patient ID:   [id number]           Room:          

Gender:       M                        Technician:   

:          [id number]               Requested By: BAILEE HINDS

Order Number: [id number]            Reading MD:     

                                 Measurements

Intervals                              Axis          

Rate:         66                       P:            37

KY:           162                      QRS:          38

QRSD:         96                       T:            25

QT:           366                                    

QTc:          385                                    

                           Interpretive Statements

SINUS RHYTHM

OTHERWISE NORMAL ECG

RI6.01          Unconfirmed report

No previous ECG available for comparison

## 2018-05-12 ENCOUNTER — HOSPITAL ENCOUNTER (EMERGENCY)
Dept: HOSPITAL 61 - ER | Age: 23
Discharge: HOME | End: 2018-05-12
Payer: SELF-PAY

## 2018-05-12 DIAGNOSIS — J02.9: Primary | ICD-10-CM

## 2018-05-12 DIAGNOSIS — F12.10: ICD-10-CM

## 2018-05-12 DIAGNOSIS — Z91.013: ICD-10-CM

## 2018-05-12 DIAGNOSIS — J45.909: ICD-10-CM

## 2018-05-12 PROCEDURE — 99283 EMERGENCY DEPT VISIT LOW MDM: CPT

## 2018-09-15 ENCOUNTER — HOSPITAL ENCOUNTER (EMERGENCY)
Dept: HOSPITAL 61 - ER | Age: 23
Discharge: HOME | End: 2018-09-15
Payer: SELF-PAY

## 2018-09-15 VITALS — WEIGHT: 155 LBS | BODY MASS INDEX: 26.46 KG/M2 | HEIGHT: 64 IN

## 2018-09-15 VITALS — SYSTOLIC BLOOD PRESSURE: 111 MMHG | DIASTOLIC BLOOD PRESSURE: 59 MMHG

## 2018-09-15 DIAGNOSIS — J45.909: ICD-10-CM

## 2018-09-15 DIAGNOSIS — J02.9: Primary | ICD-10-CM

## 2018-09-15 DIAGNOSIS — Z91.013: ICD-10-CM

## 2018-09-15 DIAGNOSIS — F41.9: ICD-10-CM

## 2018-09-15 PROCEDURE — 99283 EMERGENCY DEPT VISIT LOW MDM: CPT

## 2018-09-15 NOTE — PHYS DOC
Past Medical History


Past Medical History:  Anxiety, Asthma


Past Surgical History:  No Surgical History


Alcohol Use:  Occasionally


Drug Use:  Marijuana





Adult General


Chief Complaint


Chief Complaint:  SORE THROAT





HPI


HPI





Patient is a 23  year old male who presents with a sore throat times one week. 

The patient states that he has had pus pockets noted to the back of his throat. 

He has been intermittently febrile at home. He denies nasal congestion or 

earache. The patient was treated for strep throat a few weeks ago with 

amoxicillin.





Review of Systems


Review of Systems





Constitutional: Denies fever or chills []


Eyes: Denies change in visual acuity, redness, or eye pain []


HENT: See history of present illness


Respiratory: Denies cough or shortness of breath []


Cardiovascular: No additional information not addressed in HPI []


GI: Denies abdominal pain, nausea, vomiting, bloody stools or diarrhea []


: Denies dysuria or hematuria []


Musculoskeletal: Denies back pain or joint pain []


Integument: Denies rash or skin lesions []


Neurologic: Denies headache, focal weakness or sensory changes []


Endocrine: Denies polyuria or polydipsia []





All other systems were reviewed and found to be within normal limits, except as 

documented in this note.





Allergies


Allergies





Allergies








Coded Allergies Type Severity Reaction Last Updated Verified


 


  shellfish derived Allergy Intermediate  4/20/17 No











Physical Exam


Physical Exam





Constitutional: Well developed, well nourished, no acute distress, non-toxic 

appearance. []


HENT: Normocephalic, atraumatic, bilateral external ears normal, pharyngeal 

erythema with positive exudates, nose normal. []


Eyes: PERRLA, EOMI, conjunctiva normal, no discharge. [] 


Neck: Normal range of motion, positive anterior cervical adenopathy, supple, no 

stridor. [] 


Cardiovascular:Heart rate regular rhythm, no murmur []


Lungs & Thorax:  Bilateral breath sounds clear to auscultation []


Abdomen: Bowel sounds normal, soft, no tenderness, no masses, no pulsatile 

masses. [] 


Skin: Warm, dry, no erythema, no rash. [] 


Back: No tenderness, no CVA tenderness. [] 


Extremities: No tenderness, no cyanosis, no clubbing, ROM intact, no edema. [] 


Neurologic: Alert and oriented X 3, normal motor function, normal sensory 

function, no focal deficits noted. []


Psychologic: Affect normal, judgement normal, mood normal. []





Current Patient Data


Vital Signs





 Vital Signs








  Date Time  Temp Pulse Resp B/P (MAP) Pulse Ox O2 Delivery O2 Flow Rate FiO2


 


9/15/18 15:09 98.2 59 18 111/59 (76) 97 Room Air  





 98.2       











EKG


EKG


[]





Radiology/Procedures


Radiology/Procedures


[]





Course & Med Decision Making


Course & Med Decision Making


Pertinent Labs and Imaging studies reviewed. (See chart for details)





[]





Dragon Disclaimer


Dragon Disclaimer


This electronic medical record was generated, in whole or in part, using a 

voice recognition dictation system.





Departure


Departure


Impression:  


 Primary Impression:  


 Pharyngitis


Disposition:  01 HOME, SELF-CARE


Condition:  STABLE


Referrals:  


NO PCP (PCP)


Patient Instructions:  Viral and Bacterial Pharyngitis





Additional Instructions:  


Take the medication as prescribed. You may use ibuprofen or Tylenol for pain or 

fever. You may use salt water gargles several times daily for comfort. Follow-

up with your primary care provider in 3 days if not improving or return to the 

emergency department if worsening.


Scripts


Cephalexin (KEFLEX) 500 Mg Capsule


1 CAP PO TID, #30 CAP


   Prov: AMY REY         9/15/18











AMY REY Sep 15, 2018 15:20

## 2018-11-04 ENCOUNTER — HOSPITAL ENCOUNTER (EMERGENCY)
Dept: HOSPITAL 61 - ER | Age: 23
Discharge: HOME | End: 2018-11-04
Payer: SELF-PAY

## 2018-11-04 VITALS — SYSTOLIC BLOOD PRESSURE: 122 MMHG | DIASTOLIC BLOOD PRESSURE: 57 MMHG

## 2018-11-04 VITALS — WEIGHT: 160 LBS | HEIGHT: 70 IN | BODY MASS INDEX: 22.9 KG/M2

## 2018-11-04 DIAGNOSIS — J45.909: Primary | ICD-10-CM

## 2018-11-04 DIAGNOSIS — F17.200: ICD-10-CM

## 2018-11-04 DIAGNOSIS — Z91.013: ICD-10-CM

## 2018-11-04 DIAGNOSIS — F41.9: ICD-10-CM

## 2018-11-04 PROCEDURE — 94640 AIRWAY INHALATION TREATMENT: CPT

## 2018-11-04 PROCEDURE — 71045 X-RAY EXAM CHEST 1 VIEW: CPT

## 2018-11-04 PROCEDURE — 99283 EMERGENCY DEPT VISIT LOW MDM: CPT

## 2018-11-04 NOTE — RAD
PORTABLE CHEST 1V

 

Clinical Indication: dyspnea; hx asthma

 

Comparison:  Two-view chest September 29, 2017.

 

Findings: 

The cardiomediastinal silhouette is normal. Lungs are clear. There is no 

pneumothorax. No pleural effusion is appreciated. No acute bone 

abnormality.

 

IMPRESSION: 

No acute cardiopulmonary process.

 

 

Electronically signed by: Jack David MD (11/4/2018 5:43 AM) [id number]

## 2018-11-04 NOTE — PHYS DOC
Past Medical History


Past Medical History:  Anxiety, Asthma


Past Surgical History:  No Surgical History


Additional Information:  


1/2 PPD


Alcohol Use:  Occasionally


Drug Use:  Marijuana





Adult General


Chief Complaint


Chief Complaint:  ASTHMA





HPI


HPI





Patient is a 23  year old male presents the emergency room with shortness of 

breath and cough he thinks he has asthma. He knows in fact he has asthma he did 

not have an inhaler he says that he's had some sinus congestion that might be 

leading to these symptoms the symptoms are mild there is chest pain with deep 

breathing he says he has been coughing a fair amount.





Review of Systems


Review of Systems





Constitutional: Denies fever or chills []


Eyes: Denies change in visual acuity, redness, or eye pain []


HENT: 





All other systems were reviewed and found to be within normal limits, except as 

documented in this note.





Current Medications


Current Medications





Current Medications








 Medications


  (Trade)  Dose


 Ordered  Sig/Lorenzo  Start Time


 Stop Time Status Last Admin


Dose Admin


 


 Albuterol/


 Ipratropium


  (Duoneb)  3 ml  1X  ONCE  11/4/18 04:00


 11/4/18 04:01 DC 11/4/18 04:15


3 ML











Allergies


Allergies





Allergies








Coded Allergies Type Severity Reaction Last Updated Verified


 


  shellfish derived Allergy Intermediate  4/20/17 No











Physical Exam


Physical Exam





Constitutional: Well developed, well nourished, no acute distress, non-toxic 

appearance. []


HENT: Normocephalic, atraumatic, bilateral external ears normal, oropharynx 

moist, no oral exudates, nose normal. []


Eyes: PERRLA, EOMI, conjunctiva normal, no discharge. [] 


Neck: Normal range of motion, no tenderness, supple, no stridor. [] 


Cardiovascular:Heart rate regular rhythm, no murmur []


Lungs & Thorax: Faint wheezing bilaterally


Abdomen: Bowel sounds normal, soft, no tenderness, no masses, no pulsatile 

masses. [] 


Skin: Warm, dry, no erythema, no rash. [] 





Extremities: No tenderness, no cyanosis, no clubbing, ROM intact, no edema. [] 


Neurologic: Alert and oriented X 3, normal motor function, normal sensory 

function, no focal deficits noted. []


Psychologic: Affect normal, judgement normal, mood normal. []





Current Patient Data


Vital Signs





 Vital Signs








  Date Time  Temp Pulse Resp B/P (MAP) Pulse Ox O2 Delivery O2 Flow Rate FiO2


 


11/4/18 04:14     99 Room Air  


 


11/4/18 03:45 98.6 70 20 122/57 (78)    





 98.6       











EKG


EKG


[]





Radiology/Procedures


Radiology/Procedures


[]





Course & Med Decision Making


Course & Med Decision Making


Pertinent Labs and Imaging studies reviewed. (See chart for details)





[]Chest x-ray interpreted by me was negative acute no pneumothorax was seen 

patient was given albuterol which cleared up the wheezing prescription for 

albuterol inhaler was provided. I talked her about prednisone but because he 

cleared up with one we decided not to do it. Return precautions were discussed 

and he voiced understanding





Dragon Disclaimer


Dragon Disclaimer


This electronic medical record was generated, in whole or in part, using a 

voice recognition dictation system.





Departure


Departure


Impression:  


 Primary Impression:  


 Asthma


Disposition:  01 HOME, SELF-CARE


Condition:  STABLE


Patient Instructions:  Asthma, Adult


Scripts


Albuterol Sulfate (PROAIR HFA INHALER) 8.5 Gm Hfa.aer.ad


1 PUFF INH PRN Q6HRS PRN for SHORTNESS OF BREATH, #1 INHALER 0 Refills


   Prov: ANDREW BARRERA MD         11/4/18











ANDREW BARRERA MD Nov 4, 2018 05:33

## 2019-02-02 ENCOUNTER — HOSPITAL ENCOUNTER (EMERGENCY)
Dept: HOSPITAL 61 - ER | Age: 24
Discharge: HOME | End: 2019-02-02
Payer: SELF-PAY

## 2019-02-02 VITALS — SYSTOLIC BLOOD PRESSURE: 136 MMHG | DIASTOLIC BLOOD PRESSURE: 58 MMHG

## 2019-02-02 VITALS — HEIGHT: 71 IN | WEIGHT: 160 LBS | BODY MASS INDEX: 22.4 KG/M2

## 2019-02-02 DIAGNOSIS — F41.9: ICD-10-CM

## 2019-02-02 DIAGNOSIS — A60.01: Primary | ICD-10-CM

## 2019-02-02 DIAGNOSIS — Z91.013: ICD-10-CM

## 2019-02-02 DIAGNOSIS — J45.909: ICD-10-CM

## 2019-02-02 DIAGNOSIS — Z20.2: ICD-10-CM

## 2019-02-02 LAB
AMORPH SED URNS QL MICRO: PRESENT /HPF
APTT PPP: YELLOW S
BACTERIA #/AREA URNS HPF: (no result) /HPF
BILIRUB UR QL STRIP: NEGATIVE
FIBRINOGEN PPP-MCNC: (no result) MG/DL
NITRITE UR QL STRIP: NEGATIVE
PH UR STRIP: 8 [PH]
PROT UR STRIP-MCNC: NEGATIVE MG/DL
RBC #/AREA URNS HPF: (no result) /HPF (ref 0–2)
SQUAMOUS #/AREA URNS LPF: (no result) /LPF
UROBILINOGEN UR-MCNC: 0.2 MG/DL
WBC #/AREA URNS HPF: (no result) /HPF (ref 0–4)

## 2019-02-02 PROCEDURE — 99283 EMERGENCY DEPT VISIT LOW MDM: CPT

## 2019-02-02 PROCEDURE — 81001 URINALYSIS AUTO W/SCOPE: CPT

## 2019-02-02 PROCEDURE — 96372 THER/PROPH/DIAG INJ SC/IM: CPT

## 2019-02-02 NOTE — PHYS DOC
Past Medical History


Past Medical History:  Anxiety, Asthma, STD (Gonorrhea)


Past Surgical History:  No Surgical History


Alcohol Use:  Occasionally


Drug Use:  Marijuana





Adult General


Chief Complaint


Chief Complaint:  PENIS PROBLEM





HPI


HPI





Patient is a 23  year old male with history of gonorrhea who presents today 

complaining of painful bumps on his penis that began this morning at 2 AM. 

Patient states he had unprotected sex 2 weeks ago.





Review of Systems


Review of Systems





Constitutional: Denies fever or chills []


GI: Denies abdominal pain, nausea, vomiting, bloody stools or diarrhea []


Male -reports painful bumps on his penis


: Denies dysuria or hematuria []


Musculoskeletal: Denies back pain or joint pain []


Integument: Denies rash or skin lesions []


Neurologic: Denies headache, focal weakness or sensory changes []








All other systems were reviewed and found to be within normal limits, except as 

documented in this note.





Current Medications


Current Medications





Current Medications








 Medications


  (Trade)  Dose


 Ordered  Sig/Lorenzo  Start Time


 Stop Time Status Last Admin


Dose Admin


 


 Azithromycin


  (Zithromax)  1,000 mg  1X  ONCE  2/2/19 12:15


 2/2/19 12:16 DC 2/2/19 12:19


1,000 MG


 


 Ceftriaxone Sodium


  (Rocephin Im)  250 mg  1X  ONCE  2/2/19 12:15


 2/2/19 12:16 DC 2/2/19 12:19


250 MG


 


 Metronidazole


  (Flagyl)  2,000 mg  1X  ONCE  2/2/19 12:15


 2/2/19 12:16 DC 2/2/19 12:19


2,000 MG











Allergies


Allergies





Allergies








Coded Allergies Type Severity Reaction Last Updated Verified


 


  shellfish derived Allergy Intermediate  4/20/17 No











Physical Exam


Physical Exam





Constitutional: Well developed, well nourished, no acute distress, non-toxic 

appearance. []


Abdomen: Bowel sounds normal, soft, no tenderness, no masses, no pulsatile 

masses. [] 


Male : External penile shaft multiple grouped fluid filled lesions consistent 

with herpes 


Skin: Warm, dry, no erythema, no rash. [] 


Back: No tenderness, no CVA tenderness. [] 


Extremities: No tenderness, no cyanosis, no clubbing, ROM intact, no edema. [] 


Neurologic: Alert and oriented X 3, normal motor function, normal sensory 

function, no focal deficits noted. []


Psychologic: Affect normal, judgement normal, mood normal. []





Current Patient Data


Vital Signs





 Vital Signs








  Date Time  Temp Pulse Resp B/P (MAP) Pulse Ox O2 Delivery O2 Flow Rate FiO2


 


2/2/19 11:55 98.4 76 18 136/58 (84) 99 Room Air  





 98.4       








Lab Values





 Laboratory Tests








Test


 2/2/19


12:00


 


Urine Collection Type Unknown  


 


Urine Color Yellow  


 


Urine Clarity Cloudy  


 


Urine pH 8.0  


 


Urine Specific Gravity 1.020  


 


Urine Protein


 Negative mg/dL


(NEG-TRACE)


 


Urine Glucose (UA)


 Negative mg/dL


(NEG)


 


Urine Ketones (Stick)


 Negative mg/dL


(NEG)


 


Urine Blood


 Negative (NEG)





 


Urine Nitrite


 Negative (NEG)





 


Urine Bilirubin


 Negative (NEG)





 


Urine Urobilinogen Dipstick


 0.2 mg/dL (0.2


mg/dL)


 


Urine Leukocyte Esterase Trace (NEG)  


 


Urine RBC


 1-2 /HPF (0-2)





 


Urine WBC


 5-10 /HPF


(0-4)


 


Urine Squamous Epithelial


Cells Few /LPF  





 


Urine Amorphous Sediment Present /HPF  


 


Urine Bacteria


 Few /HPF


(0-FEW)


 


Urine Mucus Marked /LPF  











EKG


EKG


[]





Radiology/Procedures


Radiology/Procedures


[]





Course & Med Decision Making


Course & Med Decision Making


Pertinent Labs and Imaging studies reviewed. (See chart for details)





This is a 23-year-old male patient presenting to the ED today with complaints 

of bumps on his penis. On physical exam patient appears to have multiple 

grouped fluid filled lesions consistent with herpes, patient educated on the 

life long nature of the disease. Discharged on acyclovir. Also treated for STDs 

in the ED, was given Rocephin Flagyl and azithromycin. F/u with the health 

department.





Dragon Disclaimer


Dragon Disclaimer


This electronic medical record was generated, in whole or in part, using a 

voice recognition dictation system.





Departure


Departure


Impression:  


 Primary Impression:  


 Concern about STD in male without diagnosis


 Additional Impression:  


 Herpes


Disposition:  01 HOME, SELF-CARE


Condition:  STABLE


Referrals:  


NO PCP (PCP)


follow up with the health derpartment in 1-2 weeks


Patient Instructions:  Sexually Transmitted Disease





Additional Instructions:  


You were evaluated for bumps on your penile suspicious of herpes. Take the 

prescribed medications as ordered. Tylenol/ Motrin for pain or fever. Do not 

have sex for 2 weeks. Use protection at all times when you resume sex. Contact 

all your sex partners and let them know you were seen in the emergency room for 

STDs and ask them to seek treatment too.


Scripts


Acyclovir (ACYCLOVIR) 800 Mg Tablet


1 TAB PO 5XDAY, #50 TAB


   Prov: ISABEL LEIVA         2/2/19





Problem Qualifiers











ISABEL LEIVA Feb 2, 2019 12:07

## 2019-03-27 ENCOUNTER — HOSPITAL ENCOUNTER (EMERGENCY)
Dept: HOSPITAL 61 - ER | Age: 24
Discharge: HOME | End: 2019-03-27
Payer: SELF-PAY

## 2019-03-27 VITALS — HEIGHT: 70 IN | WEIGHT: 160 LBS | BODY MASS INDEX: 22.9 KG/M2

## 2019-03-27 VITALS — DIASTOLIC BLOOD PRESSURE: 57 MMHG | SYSTOLIC BLOOD PRESSURE: 109 MMHG

## 2019-03-27 DIAGNOSIS — Z91.013: ICD-10-CM

## 2019-03-27 DIAGNOSIS — F17.200: ICD-10-CM

## 2019-03-27 DIAGNOSIS — J45.21: Primary | ICD-10-CM

## 2019-03-27 DIAGNOSIS — F41.9: ICD-10-CM

## 2019-03-27 PROCEDURE — 99283 EMERGENCY DEPT VISIT LOW MDM: CPT

## 2019-03-27 NOTE — PHYS DOC
Past Medical History


Past Medical History:  Anxiety, Asthma, STD


Past Surgical History:  No Surgical History


Alcohol Use:  Occasionally


Drug Use:  Marijuana





Adult General


Chief Complaint


Chief Complaint:  ASTHMA





HPI


HPI





24-year-old smoker with a history of asthma presents with a 2-3 day history of 

cough and wheeze. He states he is out of his albuterol inhaler at home. He 

denies any fever chills or sweats. He states he does continue to smoke. He 

states his cough is nonproductive. He states he always has a flareup of his 

asthma during the pollen season.[]





Review of Systems


Review of Systems





Constitutional: Denies fever or chills []


Eyes: Denies change in visual acuity, redness, or eye pain []


HENT: Denies nasal congestion or sore throat []


Respiratory: Per history of present illness[]


Cardiovascular: No additional information not addressed in HPI []


GI: Denies abdominal pain, nausea, vomiting, bloody stools or diarrhea []


: Denies dysuria or hematuria []


Musculoskeletal: Denies back pain or joint pain []


Integument: Denies rash or skin lesions []


Neurologic: Denies headache, focal weakness or sensory changes []


Endocrine: Denies polyuria or polydipsia []





All other systems were reviewed and found to be within normal limits, except as 

documented in this note.





Allergies


Allergies





Allergies








Coded Allergies Type Severity Reaction Last Updated Verified


 


  shellfish derived Allergy Intermediate  4/20/17 No











Physical Exam


Physical Exam





Constitutional: Well developed, well nourished, no acute distress, non-toxic 

appearance. []


HENT: Normocephalic, atraumatic, bilateral external ears normal, oropharynx 

moist, no oral exudates, nose normal. []


Eyes: PERRLA, EOMI, conjunctiva normal, no discharge. [] 


Neck: Normal range of motion, no tenderness, supple, no stridor. [] 


Cardiovascular:Heart rate regular rhythm, no murmur []


Lungs & Thorax: Mild scattered wheezes throughout both lungs greater than right[

]


Abdomen: Bowel sounds normal, soft, no tenderness, no masses, no pulsatile 

masses. [] 


Skin: Warm, dry, no erythema, no rash. [] 


Back: No tenderness, no CVA tenderness. [] 


Extremities: No tenderness, no cyanosis, no clubbing, ROM intact, no edema. [] 


Neurologic: Alert and oriented X 3, normal motor function, normal sensory 

function, no focal deficits noted. []


Psychologic: Affect normal, judgement normal, mood normal. []





EKG


EKG


[]





Radiology/Procedures


Radiology/Procedures


[]





Course & Med Decision Making


Course & Med Decision Making


Pertinent Labs and Imaging studies reviewed. (See chart for details)





[]





Dragon Disclaimer


Dragon Disclaimer


This electronic medical record was generated, in whole or in part, using a 

voice recognition dictation system.





Departure


Departure


Impression:  


 Primary Impression:  


 Asthma


Disposition:  01 HOME, SELF-CARE


Condition:  STABLE


Referrals:  


NO PCP (PCP)


Patient Instructions:  Asthma Attacks, Prevention, Asthma, Acute Bronchospasm





Additional Instructions:  


Return to the emergency department with any new or concerning symptoms


Scripts


Prednisone (PREDNISONE) 20 Mg Tablet


3 TAB PO DAILY PRN for COUGH, #15 TAB


   Prov: DANY CANTU DO         3/27/19 


Albuterol Sulfate (PROVENTIL HFA INHALER) 6.7 Gm Hfa.aer.ad


2 PUFF IH PRN Q4HRS PRN for FOR ASTHMA, #1 INHALER 6 Refills


   Prov: DANY CANTU DO         3/27/19





Problem Qualifiers








 Primary Impression:  


 Asthma


 Asthma severity:  mild  Asthma persistence:  intermittent  Asthma complication 

type:  with acute exacerbation  Qualified Codes:  J45.21 - Mild intermittent 

asthma with (acute) exacerbation








DANY CANTU DO Mar 27, 2019 07:27

## 2020-05-08 ENCOUNTER — HOSPITAL ENCOUNTER (EMERGENCY)
Dept: HOSPITAL 61 - ER | Age: 25
Discharge: HOME | End: 2020-05-08
Payer: SELF-PAY

## 2020-05-08 VITALS
SYSTOLIC BLOOD PRESSURE: 125 MMHG | DIASTOLIC BLOOD PRESSURE: 61 MMHG | DIASTOLIC BLOOD PRESSURE: 61 MMHG | SYSTOLIC BLOOD PRESSURE: 125 MMHG

## 2020-05-08 VITALS — HEIGHT: 70 IN | BODY MASS INDEX: 24.3 KG/M2 | WEIGHT: 169.76 LBS

## 2020-05-08 DIAGNOSIS — S83.411A: Primary | ICD-10-CM

## 2020-05-08 DIAGNOSIS — Z91.013: ICD-10-CM

## 2020-05-08 DIAGNOSIS — F17.200: ICD-10-CM
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
## 2020-05-08 DIAGNOSIS — J45.909: ICD-10-CM
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
## 2020-05-08 PROCEDURE — 99284 EMERGENCY DEPT VISIT MOD MDM: CPT

## 2020-05-08 PROCEDURE — 96372 THER/PROPH/DIAG INJ SC/IM: CPT

## 2020-05-08 NOTE — PHYS DOC
Past Medical History


Past Medical History:  Anxiety, Asthma, STD


Past Surgical History:  No Surgical History


Smoking Status:  Current Every Day Smoker


Alcohol Use:  Occasionally


Drug Use:  None





General Adult


EDM:


Chief Complaint:  KNEE INJURY





HPI:


HPI:





Patient is a 25-year-old otherwise healthy male who presents with a right knee 

injury.  He states last week he was bouncing on a trampoline with his boys and 

injured his knee.  He states he is tried rest, ice, compression and elevation 

with some reduction in the swelling.  He states the pain and swelling was 

predominantly located on the inside of his right knee.  He states last night his

knee felt like it locked up.  He is having difficulty straightening his knee and

walking without discomfort.  []





Review of Systems:


Review of Systems:


Constitutional:  Denies fever or chills. []


Eyes:  Denies change in visual acuity. []


HENT:  Denies nasal congestion or sore throat. [] 


Respiratory:  Denies cough or shortness of breath. [] 


Cardiovascular:  Denies chest pain or edema. [] 


GI:  Denies abdominal pain, nausea, vomiting, bloody stools or diarrhea. [] 


:  Denies dysuria. [] 


Musculoskeletal: Per HPI [] 


Integument:  Denies rash. [] 


Neurologic:  Denies headache, focal weakness or sensory changes. [] 


Endocrine:  Denies polyuria or polydipsia. [] 


Lymphatic:  Denies swollen glands. [] 


Psychiatric:  Denies depression or anxiety. []





Heart Score:


Risk Factors:


Risk Factors:  DM, Current or recent (<one month) smoker, HTN, HLP, family 

history of CAD, obesity.


Risk Scores:


Score 0 - 3:  2.5% MACE over next 6 weeks - Discharge Home


Score 4 - 6:  20.3% MACE over next 6 weeks - Admit for Clinical Observation


Score 7 - 10:  72.7% MACE over next 6 weeks - Early Invasive Strategies





Allergies:


Allergies:





Allergies








Coded Allergies Type Severity Reaction Last Updated Verified


 


  shellfish derived Allergy Intermediate  4/20/17 No











Physical Exam:


PE:





Constitutional: Well developed, well nourished, mild distress, non-toxic 

appearance. []


HENT: Normocephalic, atraumatic, bilateral external ears normal, oropharynx 

moist, no oral exudates, nose normal. []


Eyes: PERRLA, EOMI, conjunctiva normal, no discharge. [] 


Neck: Normal range of motion, no tenderness, supple, no stridor. [] 


Cardiovascular:Heart rate regular rhythm, no murmur []


Lungs & Thorax:  Bilateral breath sounds clear to auscultation []


Abdomen: Bowel sounds normal, soft, no tenderness, no masses, no pulsatile 

masses. [] 


Skin: Warm, dry, no erythema, no rash. [] 


Back: No tenderness, no CVA tenderness. [] 


Extremities: Right knee is tender to palp in the medial aspect there is no 

significant swelling with forced valgus there is pain on the medial aspect of 

his knee however it does feel stable [] 


Neurologic: Alert and oriented X 3, normal motor function, normal sensory 

function, no focal deficits noted. []


Psychologic: Affect normal, judgement normal, mood normal. []





EKG:


EKG:


[]





Radiology/Procedures:


Radiology/Procedures:


[]





Course & Med Decision Making:


Course & Med Decision Making


Pertinent Labs and Imaging studies reviewed. (See chart for details)





[]





Dragon Disclaimer:


Dragon Disclaimer:


This electronic medical record was generated, in whole or in part, using a voice

 recognition dictation system.





Departure


Departure


Impression:  


   Primary Impression:  


   Right knee sprain


   Qualified Codes:  S83.411A - Sprain of medial collateral ligament of right 

   knee, initial encounter


Disposition:  01 HOME, SELF-CARE


Condition:  STABLE


Referrals:  


NO PCP (PCP)








HARISH MENDEZ MD


Follow with Dr. Mendez in the next 1 to 2 weeks for further evaluation and 

possible MRI


Patient Instructions:  Combined Knee Ligament Sprain-SportsMed, Knee - Cartilage

 (Meniscus) Injury, Knee Replacement, Preparing For


Scripts


Naproxen (NAPROXEN) 500 Mg Tablet


1 TAB PO BID PRN for PAIN, #30 TAB 1 Refill


   Prov: DANY CANTU DO         5/8/20











DANY CANTU DO              May 8, 2020 07:37

## 2020-06-30 ENCOUNTER — HOSPITAL ENCOUNTER (OUTPATIENT)
Dept: HOSPITAL 61 - KCIC MRI | Age: 25
End: 2020-06-30
Attending: ORTHOPAEDIC SURGERY
Payer: COMMERCIAL

## 2020-06-30 DIAGNOSIS — R60.0: Primary | ICD-10-CM

## 2020-06-30 PROCEDURE — 73721 MRI JNT OF LWR EXTRE W/O DYE: CPT

## 2020-06-30 NOTE — KCIC
STUDY: MRI of the right knee without contrast

 

INDICATION: Persistent knee pain. Swelling. 

 

COMPARISON: 6/22/2020 radiographs.

 

TECHNIQUE: Multiplanar MR imaging of the right knee performed without the 

use of intravenous or intra-articular contrast.

 

FINDINGS:

 

Menisci: Intact medial and lateral menisci.

 

Cruciate ligaments: Intact.

 

Collateral ligaments: Intact. 

 

Tendons: Intact extensor mechanism. The additional tendons at the knee are

unremarkable.

 

Cartilage:

 

Patellofemoral: High-grade chondrosis centered at the lateral margin of 

the patellar median ridge with fissuring and delamination traversing along

the subchondral bone plate collectively extending in the craniocaudal 

dimension by 1 cm. The region of involvement measures up to 0.6 cm 

transverse. Associated subchondral edema. No high-grade chondrosis of the 

trochlea is identified.

Lateral compartment: Intact.

Medial compartment: Intact. 

 

Bones: TT-TT TG distance is within normal limits. High riding patella but 

this may be physiologic given near complete extension of the knee. No 

acute fracture or aggressive marrow signal abnormality. 

 

Miscellaneous: No significant knee joint effusion. Mild edema at the 

superolateral aspect of Hoffa's fat, image 16 series 5. 

 

IMPRESSION:

 

1.  High-grade chondrosis centered along the lateral margin of the patella

median ridge to include full-thickness fissuring and a delamination 

traversing along the subchondral bone plate. The region of involvement 

measures approximately 1 x 0.6 cm. Mild associated subchondral marrow 

edema.

2.  High riding patella but it is difficult to confirm patella lyssa given 

the degree of knee extension during the exam. Though the TT-TG distance is

normal, there is mild edema at the superolateral aspect of Hoffa's fat 

which can be seen in the setting of patellar maltracking. Recommend 

correlation with patient symptomatology.

3.  Intact menisci and cruciate/collateral ligaments.

 

Electronically signed by: MINOR GILES MD (6/30/2020 2:41 PM) [place] [id number]

## 2021-12-16 ENCOUNTER — HOSPITAL ENCOUNTER (EMERGENCY)
Dept: HOSPITAL 61 - ER | Age: 26
Discharge: HOME | End: 2021-12-16
Payer: SELF-PAY

## 2021-12-16 VITALS — WEIGHT: 165.35 LBS | BODY MASS INDEX: 23.67 KG/M2 | HEIGHT: 70 IN

## 2021-12-16 VITALS — DIASTOLIC BLOOD PRESSURE: 74 MMHG | SYSTOLIC BLOOD PRESSURE: 118 MMHG

## 2021-12-16 DIAGNOSIS — M54.2: Primary | ICD-10-CM

## 2021-12-16 DIAGNOSIS — J45.909: ICD-10-CM

## 2021-12-16 DIAGNOSIS — Z91.013: ICD-10-CM

## 2021-12-16 PROCEDURE — 99283 EMERGENCY DEPT VISIT LOW MDM: CPT

## 2021-12-16 NOTE — PHYS DOC
Past Medical History


Past Medical History:  Asthma


 (MAIK BOO APRN)


Past Surgical History:  No Surgical History


 [facility]


Smoking Status:  Never Smoker


Alcohol Use:  None


Drug Use:  None


 ([facility]





General Adult


EDM:


Chief Complaint:  NECK PAIN





HPI:


HPI:





Patient is a 26  year old male who presents with 3 days of right-sided neck 

tightness that goes into the shoulder with a sharp shooting pain that at times 

will go down his back.  He has been taking ibuprofen and Tylenol but it is not 

helping much.  He states that he does play video games for quite some time and 

he is always leaning to the right side while he is playing.  He denies fever, 

nausea, vomiting, diarrhea, chest pain, shortness of air, recent illness, 

numbness or tingling, focal weakness.  Rates his discomfort at a 9 out of 10.  

Only other history is asthma.


 (MAIK BOO APRN)





Review of Systems:


Review of Systems:


Constitutional:   Denies fever or chills. []


Eyes:   Denies change in visual acuity. []


HENT:   Denies nasal congestion or sore throat. [] 


Respiratory:   Denies cough or shortness of breath. [] 


Cardiovascular:   Denies chest pain or edema. [] 


GI:   Denies abdominal pain, nausea, vomiting, bloody stools or diarrhea. [] 


:  Denies dysuria. [] 


Musculoskeletal:   + back pain or denies joint pain. + Right-sided neck pain [] 


Integument:   Denies rash. [] 


Neurologic:   Denies headache, focal weakness or sensory changes. [] 


Endocrine:   Denies polyuria or polydipsia. [] 


Lymphatic:  Denies swollen glands. [] 


Psychiatric:  Denies depression or anxiety. []


 (MAIK BOO APRN)





Heart Score:


C/O Chest Pain:  No


 [facility]





Allergies:


Allergies:





Allergies








Coded Allergies Type Severity Reaction Last Updated Verified


 


  shellfish derived Allergy Intermediate  12/16/21 No








 ([facility])





Physical Exam:


PE:





Constitutional: Well developed, well nourished, no acute distress, non-toxic 

appearance. []


HENT: Normocephalic, atraumatic, bilateral external ears normal, oropharynx 

moist, no oral exudates, nose normal. []


Eyes: PERRLA, EOMI, conjunctiva normal, no discharge. [] 


Neck: Slightly limited range of motion, no tenderness, supple, no stridor.  No 

nuchal rigidity [] 


Cardiovascular:Heart rate regular rhythm, no murmur []


Lungs & Thorax:  Bilateral breath sounds clear to auscultation []


Abdomen: Bowel sounds normal, soft, no tenderness, no masses, no pulsatile 

masses. [] 


Skin: Warm, dry, no erythema, no rash. [] 


Back: No tenderness, no CVA tenderness. [] 


Extremities: No tenderness, no cyanosis, no clubbing, ROM intact, no edema. [] 


Neurologic: Alert and oriented X 3, normal motor function, normal sensory 

function, no focal deficits noted. []


Psychologic: Affect normal, judgement normal, mood normal. []


 (MAIK BOO)





Current Patient Data:


Vital Signs:





                                   Vital Signs








  Date Time  Temp Pulse Resp B/P (MAP) Pulse Ox O2 Delivery O2 Flow Rate FiO2


 


12/16/21 08:50 98.1 55 14 124/68 (86) 99 Room Air  





 98.1       








 (MAIK BOO)





EKG:


EKG:


[]


 (MAIK BOO)





Radiology/Procedures:


Radiology/Procedures:


[]


 (MAIK BOO)





Course & Med Decision Making:


Course & Med Decision Making


Pertinent Labs and Imaging studies reviewed. (See chart for details)





See HPI.  Alert and oriented x4.  Ambulatory steady gait.  Speaks in full clear 

sentences.  Moving all extremities equally with equal strength.  No extremity 

edema.  No nuchal rigidity.  Slightly limited range of motion of neck due to 

right-sided neck tightness that goes into right trapezius and slightly down the 

mid part of the upper back.  Skin pink warm and dry.  Afebrile.





[]


 (MAIK BOO)





Dragon Disclaimer:


Dragon Disclaimer:


This electronic medical record was generated, in whole or in part, using a voice

 recognition dictation system.


 (MAIK BOO)





Departure


Departure


Impression:  


   Primary Impression:  


   Neck pain


Disposition:  01 HOME / SELF CARE / HOMELESS


Condition:  STABLE


Referrals:  


NO PCP (PCP)


Patient Instructions:  Torticollis, Acute





Additional Instructions:  


Follow-up with primary care provider if needed.  Take medication as prescribed 

and with food.  Member medications can make you sleepy so do not drive or drink 

any alcohol or do any other drugs on top of them.  Drink plenty of fluids.  If 

you begin running a fever or symptoms worsen return to the ED.  Also try using a

 heating pad or ice.


Scripts


Cyclobenzaprine Hcl (CYCLOBENZAPRINE HCL) 10 Mg Tablet


1 TAB PO TID, #15 TAB


   Prov: MAIK BOO         12/16/21 


Ibuprofen (IBUPROFEN) 600 Mg Tablet


600 MG PO PRN Q6HRS PRN for INFLAMMATION, #26 TAB


   Prov: MAIK BOO         12/16/21


Attending Signature


I have participated in the care of this patient and I have reviewed and agree 

with all pertinent clinical information above including history, exam, and 

recommendations.





 (RONNI LEVI DO)











MAIK BOO            Dec 16, 2021 09:40


RONNI LEVI DO               Dec 16, 2021 10:20

## 2022-05-03 ENCOUNTER — HOSPITAL ENCOUNTER (EMERGENCY)
Dept: HOSPITAL 61 - ER | Age: 27
Discharge: HOME | End: 2022-05-03
Payer: SELF-PAY

## 2022-05-03 VITALS — DIASTOLIC BLOOD PRESSURE: 78 MMHG | SYSTOLIC BLOOD PRESSURE: 151 MMHG

## 2022-05-03 VITALS — WEIGHT: 161.38 LBS | HEIGHT: 70 IN | BODY MASS INDEX: 23.1 KG/M2

## 2022-05-03 DIAGNOSIS — J45.909: ICD-10-CM

## 2022-05-03 DIAGNOSIS — Z20.2: ICD-10-CM

## 2022-05-03 DIAGNOSIS — Z91.013: ICD-10-CM

## 2022-05-03 DIAGNOSIS — R30.0: Primary | ICD-10-CM

## 2022-05-03 LAB
BACTERIA #/AREA URNS HPF: (no result) /HPF
RBC #/AREA URNS HPF: (no result) /HPF (ref 0–2)
WBC #/AREA URNS HPF: (no result) /HPF (ref 0–4)

## 2022-05-03 PROCEDURE — 99283 EMERGENCY DEPT VISIT LOW MDM: CPT

## 2022-05-03 PROCEDURE — 87491 CHLMYD TRACH DNA AMP PROBE: CPT

## 2022-05-03 PROCEDURE — 96372 THER/PROPH/DIAG INJ SC/IM: CPT

## 2022-05-03 PROCEDURE — 87086 URINE CULTURE/COLONY COUNT: CPT

## 2022-05-03 PROCEDURE — 87591 N.GONORRHOEAE DNA AMP PROB: CPT

## 2022-05-03 PROCEDURE — 81001 URINALYSIS AUTO W/SCOPE: CPT

## 2022-05-03 NOTE — PHYS DOC
Past Medical History


Past Medical History:  Asthma


Past Surgical History:  No Surgical History


Smoking Status:  Never Smoker


Alcohol Use:  None


Drug Use:  None





General Adult


EDM:


Chief Complaint:  SEXUALLY TRANSMITTED DISEASE





HPI:


HPI:





27-year-old male presents with 1 week history of dysuria.  Reports today noted s

ome whitish discharge.  Patient reports he is sexually active.  Concern for 

possible sexually transmitted disease.  Denies any fever or chills.  Denies 

trauma.  Reports some difficulty in starting his stream.  Denies testicular 

pain.  Reports he is circumcised.





Review of Systems:


Review of Systems:





Constitutional: Denies fever or chills 


Eyes: Denies redness or eye pain 


HENT: Denies nasal congestion or sore throat


Respiratory: Denies cough or shortness of breath 


Cardiovascular: Denies chest pain or palpitations


GI: Denies abdominal pain, nausea, or vomiting


: Denies hematuria; reports dysuria and penile discharge


Musculoskeletal: Denies back pain or joint pain


Integument: Denies rash or skin lesions 


Neurologic: Denies headache, focal weakness or sensory changes





Complete systems were reviewed and found to be within normal limits, except as 

documented in this note.





Heart Score:


C/O Chest Pain:  N/A





Current Medications:





Current Medications








 Medications


  (Trade)  Dose


 Ordered  Sig/Lorenzo  Start Time


 Stop Time Status Last Admin


Dose Admin


 


 Ceftriaxone Sodium


  (Rocephin Im)  500 mg  1X  ONCE  5/3/22 21:15


 5/3/22 21:16 DC  














Allergies:


Allergies:





Allergies








Coded Allergies Type Severity Reaction Last Updated Verified


 


  shellfish derived Allergy Intermediate  12/16/21 No











Physical Exam:


PE:





Constitutional: Well developed, well nourished, no acute distress, non-toxic 

appearance


HENT: Normocephalic, atraumatic


Eyes: Conjunctiva normal, no discharge


Neck: Normal range of motion, supple


Lungs & Thorax:  No respiratory distress, equal chest rise and fall


Abdomen: Soft, no tenderness


: Normal circumcised external genitalia, white discharge noted at penile os, 

no testicular swelling or pain on palpation, cremasteric reflex intact


Skin: Warm, dry, no erythema, no rash


Back: No tenderness, no CVA tenderness


Extremities: No tenderness, ROM intact, no edema


Neurologic: Alert and oriented X 3, no focal deficits noted


Psychologic: Affect normal, judgment normal





Current Patient Data:


Vital Signs:





                                   Vital Signs








  Date Time  Temp Pulse Resp B/P (MAP) Pulse Ox O2 Delivery O2 Flow Rate FiO2


 


5/3/22 21:09 98.4 90 18 151/78 (102) 99 Room Air  





 98.4       











EKG:


EKG:


[]





Radiology/Procedures:


Radiology/Procedures:


[]





Course & Med Decision Making:


Course & Med Decision Making


Pertinent Lab studies reviewed. (See chart for details)





Nontoxic patient presents with dysuria with concern for possible STD.  UA 

obtained.  Urine chlamydia/gonorrhea cultures pending.  Empiric antibiotic 

initiated.





Patient stable for discharge with outpatient follow-up with PCP. Discussed 

findings and plan with patient, who acknowledges understanding and agreement.





Dragon Disclaimer:


Dragon Disclaimer:


This electronic medical record was generated, in whole or in part, using a voice

 recognition dictation system.





Departure


Departure


Impression:  


   Primary Impression:  


   Dysuria


   Additional Impression:  


   Concern about STD in male without diagnosis


Disposition:  01 HOME / SELF CARE / HOMELESS


Condition:  STABLE


Referrals:  


NO PCP (PCP)


Patient Instructions:  Dysuria, Sexually Transmitted Disease, Easy-to-Read


Scripts


Doxycycline Hyclate (DOXYCYCLINE HYCLATE) 100 Mg Capsule


1 CAP PO BID for 7 Days, #14 CAP


   Prov: JIL KOVACS DO         5/3/22











JIL KOVACS DO              May 3, 2022 21:27